# Patient Record
Sex: FEMALE | Race: OTHER | HISPANIC OR LATINO
[De-identification: names, ages, dates, MRNs, and addresses within clinical notes are randomized per-mention and may not be internally consistent; named-entity substitution may affect disease eponyms.]

---

## 2023-04-28 PROBLEM — Z00.00 ENCOUNTER FOR PREVENTIVE HEALTH EXAMINATION: Status: ACTIVE | Noted: 2023-04-28

## 2023-05-01 ENCOUNTER — APPOINTMENT (OUTPATIENT)
Dept: PEDIATRIC CARDIOLOGY | Facility: CLINIC | Age: 38
End: 2023-05-01
Payer: COMMERCIAL

## 2023-05-01 PROCEDURE — 76820 UMBILICAL ARTERY ECHO: CPT

## 2023-05-01 PROCEDURE — 76827 ECHO EXAM OF FETAL HEART: CPT

## 2023-05-01 PROCEDURE — 99203 OFFICE O/P NEW LOW 30 MIN: CPT | Mod: 25

## 2023-05-01 PROCEDURE — 76825 ECHO EXAM OF FETAL HEART: CPT

## 2023-05-01 PROCEDURE — 76821 MIDDLE CEREBRAL ARTERY ECHO: CPT

## 2023-05-01 PROCEDURE — 93325 DOPPLER ECHO COLOR FLOW MAPG: CPT | Mod: 59

## 2023-07-29 ENCOUNTER — INPATIENT (INPATIENT)
Facility: HOSPITAL | Age: 38
LOS: 0 days | Discharge: ANOTHER HOSPITAL | DRG: 305 | End: 2023-07-29
Attending: OBSTETRICS & GYNECOLOGY | Admitting: OBSTETRICS & GYNECOLOGY
Payer: COMMERCIAL

## 2023-07-29 ENCOUNTER — INPATIENT (INPATIENT)
Facility: HOSPITAL | Age: 38
LOS: 2 days | Discharge: HOME | End: 2023-08-01
Attending: SPECIALIST | Admitting: SPECIALIST
Payer: COMMERCIAL

## 2023-07-29 VITALS
DIASTOLIC BLOOD PRESSURE: 69 MMHG | RESPIRATION RATE: 18 BRPM | SYSTOLIC BLOOD PRESSURE: 123 MMHG | TEMPERATURE: 98 F | HEART RATE: 82 BPM

## 2023-07-29 VITALS — HEIGHT: 60 IN | WEIGHT: 149.91 LBS

## 2023-07-29 DIAGNOSIS — Z3A.00 WEEKS OF GESTATION OF PREGNANCY NOT SPECIFIED: ICD-10-CM

## 2023-07-29 DIAGNOSIS — Q87.2 CONGENITAL MALFORMATION SYNDROMES PREDOMINANTLY INVOLVING LIMBS: ICD-10-CM

## 2023-07-29 DIAGNOSIS — O26.899 OTHER SPECIFIED PREGNANCY RELATED CONDITIONS, UNSPECIFIED TRIMESTER: ICD-10-CM

## 2023-07-29 DIAGNOSIS — Z34.80 ENCOUNTER FOR SUPERVISION OF OTHER NORMAL PREGNANCY, UNSPECIFIED TRIMESTER: ICD-10-CM

## 2023-07-29 DIAGNOSIS — O42.10 PREMATURE RUPTURE OF MEMBRANES, ONSET OF LABOR MORE THAN 24 HOURS FOLLOWING RUPTURE, UNSPECIFIED WEEKS OF GESTATION: ICD-10-CM

## 2023-07-29 LAB
ABO RH CONFIRMATION: SIGNIFICANT CHANGE UP
AMNISURE ROM (RUPTURE OF MEMBRANES): POSITIVE
APTT BLD: 27.5 SEC — SIGNIFICANT CHANGE UP (ref 24.5–35.6)
BASOPHILS # BLD AUTO: 0.01 K/UL — SIGNIFICANT CHANGE UP (ref 0–0.2)
BASOPHILS # BLD AUTO: 0.03 K/UL — SIGNIFICANT CHANGE UP (ref 0–0.2)
BASOPHILS NFR BLD AUTO: 0.1 % — SIGNIFICANT CHANGE UP (ref 0–2)
BASOPHILS NFR BLD AUTO: 0.3 % — SIGNIFICANT CHANGE UP (ref 0–2)
BLD GP AB SCN SERPL QL: NEGATIVE — SIGNIFICANT CHANGE UP
EOSINOPHIL # BLD AUTO: 0 K/UL — SIGNIFICANT CHANGE UP (ref 0–0.5)
EOSINOPHIL # BLD AUTO: 0.07 K/UL — SIGNIFICANT CHANGE UP (ref 0–0.5)
EOSINOPHIL NFR BLD AUTO: 0 % — SIGNIFICANT CHANGE UP (ref 0–6)
EOSINOPHIL NFR BLD AUTO: 0.7 % — SIGNIFICANT CHANGE UP (ref 0–6)
HBV SURFACE AG SERPL QL IA: SIGNIFICANT CHANGE UP
HCT VFR BLD CALC: 40.2 % — SIGNIFICANT CHANGE UP (ref 34.5–45)
HCT VFR BLD CALC: 41.3 % — SIGNIFICANT CHANGE UP (ref 34.5–45)
HGB BLD-MCNC: 13.3 G/DL — SIGNIFICANT CHANGE UP (ref 11.5–15.5)
HGB BLD-MCNC: 13.8 G/DL — SIGNIFICANT CHANGE UP (ref 11.5–15.5)
HIV 1 & 2 AB SERPL IA.RAPID: SIGNIFICANT CHANGE UP
HIV 1+2 AB+HIV1 P24 AG SERPL QL IA: SIGNIFICANT CHANGE UP
IANC: 6.64 K/UL — SIGNIFICANT CHANGE UP (ref 1.8–7.4)
IMM GRANULOCYTES NFR BLD AUTO: 0.4 % — SIGNIFICANT CHANGE UP (ref 0–0.9)
IMM GRANULOCYTES NFR BLD AUTO: 0.4 % — SIGNIFICANT CHANGE UP (ref 0–0.9)
INR BLD: 0.87 RATIO — SIGNIFICANT CHANGE UP (ref 0.85–1.18)
LYMPHOCYTES # BLD AUTO: 1.19 K/UL — SIGNIFICANT CHANGE UP (ref 1–3.3)
LYMPHOCYTES # BLD AUTO: 14.9 % — SIGNIFICANT CHANGE UP (ref 13–44)
LYMPHOCYTES # BLD AUTO: 2.82 K/UL — SIGNIFICANT CHANGE UP (ref 1–3.3)
LYMPHOCYTES # BLD AUTO: 28.6 % — SIGNIFICANT CHANGE UP (ref 13–44)
MCHC RBC-ENTMCNC: 32.1 PG — SIGNIFICANT CHANGE UP (ref 27–34)
MCHC RBC-ENTMCNC: 32.2 PG — SIGNIFICANT CHANGE UP (ref 27–34)
MCHC RBC-ENTMCNC: 33.1 GM/DL — SIGNIFICANT CHANGE UP (ref 32–36)
MCHC RBC-ENTMCNC: 33.4 GM/DL — SIGNIFICANT CHANGE UP (ref 32–36)
MCV RBC AUTO: 96 FL — SIGNIFICANT CHANGE UP (ref 80–100)
MCV RBC AUTO: 97.3 FL — SIGNIFICANT CHANGE UP (ref 80–100)
MONOCYTES # BLD AUTO: 0.1 K/UL — SIGNIFICANT CHANGE UP (ref 0–0.9)
MONOCYTES # BLD AUTO: 0.57 K/UL — SIGNIFICANT CHANGE UP (ref 0–0.9)
MONOCYTES NFR BLD AUTO: 1.3 % — LOW (ref 2–14)
MONOCYTES NFR BLD AUTO: 5.8 % — SIGNIFICANT CHANGE UP (ref 2–14)
NEUTROPHILS # BLD AUTO: 6.34 K/UL — SIGNIFICANT CHANGE UP (ref 1.8–7.4)
NEUTROPHILS # BLD AUTO: 6.64 K/UL — SIGNIFICANT CHANGE UP (ref 1.8–7.4)
NEUTROPHILS NFR BLD AUTO: 64.2 % — SIGNIFICANT CHANGE UP (ref 43–77)
NEUTROPHILS NFR BLD AUTO: 83.3 % — HIGH (ref 43–77)
NRBC # BLD: 0 /100 WBCS — SIGNIFICANT CHANGE UP (ref 0–0)
NRBC # BLD: 0 /100 WBCS — SIGNIFICANT CHANGE UP (ref 0–0)
NRBC # FLD: 0 K/UL — SIGNIFICANT CHANGE UP (ref 0–0)
PLATELET # BLD AUTO: 221 K/UL — SIGNIFICANT CHANGE UP (ref 150–400)
PLATELET # BLD AUTO: 224 K/UL — SIGNIFICANT CHANGE UP (ref 150–400)
PROTHROM AB SERPL-ACNC: 10 SEC — SIGNIFICANT CHANGE UP (ref 9.5–13)
RBC # BLD: 4.13 M/UL — SIGNIFICANT CHANGE UP (ref 3.8–5.2)
RBC # BLD: 4.3 M/UL — SIGNIFICANT CHANGE UP (ref 3.8–5.2)
RBC # FLD: 13.8 % — SIGNIFICANT CHANGE UP (ref 10.3–14.5)
RBC # FLD: 13.8 % — SIGNIFICANT CHANGE UP (ref 10.3–14.5)
RH IG SCN BLD-IMP: POSITIVE — SIGNIFICANT CHANGE UP
RH IG SCN BLD-IMP: POSITIVE — SIGNIFICANT CHANGE UP
WBC # BLD: 7.97 K/UL — SIGNIFICANT CHANGE UP (ref 3.8–10.5)
WBC # BLD: 9.87 K/UL — SIGNIFICANT CHANGE UP (ref 3.8–10.5)
WBC # FLD AUTO: 7.97 K/UL — SIGNIFICANT CHANGE UP (ref 3.8–10.5)
WBC # FLD AUTO: 9.87 K/UL — SIGNIFICANT CHANGE UP (ref 3.8–10.5)

## 2023-07-29 PROCEDURE — 85025 COMPLETE CBC W/AUTO DIFF WBC: CPT

## 2023-07-29 PROCEDURE — 86901 BLOOD TYPING SEROLOGIC RH(D): CPT

## 2023-07-29 PROCEDURE — 87491 CHLMYD TRACH DNA AMP PROBE: CPT

## 2023-07-29 PROCEDURE — 36415 COLL VENOUS BLD VENIPUNCTURE: CPT

## 2023-07-29 PROCEDURE — 72132 CT LUMBAR SPINE W/DYE: CPT | Mod: 26

## 2023-07-29 PROCEDURE — 85730 THROMBOPLASTIN TIME PARTIAL: CPT

## 2023-07-29 PROCEDURE — G0463: CPT

## 2023-07-29 PROCEDURE — 86703 HIV-1/HIV-2 1 RESULT ANTBDY: CPT

## 2023-07-29 PROCEDURE — 87389 HIV-1 AG W/HIV-1&-2 AB AG IA: CPT

## 2023-07-29 PROCEDURE — 87653 STREP B DNA AMP PROBE: CPT

## 2023-07-29 PROCEDURE — 86923 COMPATIBILITY TEST ELECTRIC: CPT

## 2023-07-29 PROCEDURE — 87591 N.GONORRHOEAE DNA AMP PROB: CPT

## 2023-07-29 PROCEDURE — 86850 RBC ANTIBODY SCREEN: CPT

## 2023-07-29 PROCEDURE — 84112 EVAL AMNIOTIC FLUID PROTEIN: CPT

## 2023-07-29 PROCEDURE — 87340 HEPATITIS B SURFACE AG IA: CPT

## 2023-07-29 PROCEDURE — 85384 FIBRINOGEN ACTIVITY: CPT

## 2023-07-29 PROCEDURE — 72129 CT CHEST SPINE W/DYE: CPT | Mod: 26

## 2023-07-29 PROCEDURE — 86780 TREPONEMA PALLIDUM: CPT

## 2023-07-29 PROCEDURE — 59025 FETAL NON-STRESS TEST: CPT

## 2023-07-29 PROCEDURE — 86762 RUBELLA ANTIBODY: CPT

## 2023-07-29 PROCEDURE — 85610 PROTHROMBIN TIME: CPT

## 2023-07-29 PROCEDURE — 86900 BLOOD TYPING SEROLOGIC ABO: CPT

## 2023-07-29 RX ORDER — ASPIRIN/CALCIUM CARB/MAGNESIUM 324 MG
1 TABLET ORAL
Refills: 0 | DISCHARGE

## 2023-07-29 RX ORDER — SODIUM CHLORIDE 9 MG/ML
1000 INJECTION INTRAMUSCULAR; INTRAVENOUS; SUBCUTANEOUS
Refills: 0 | Status: DISCONTINUED | OUTPATIENT
Start: 2023-07-29 | End: 2023-07-29

## 2023-07-29 RX ORDER — AMPICILLIN TRIHYDRATE 250 MG
1 CAPSULE ORAL EVERY 4 HOURS
Refills: 0 | Status: DISCONTINUED | OUTPATIENT
Start: 2023-07-29 | End: 2023-07-29

## 2023-07-29 RX ORDER — CHLORHEXIDINE GLUCONATE 213 G/1000ML
1 SOLUTION TOPICAL DAILY
Refills: 0 | Status: DISCONTINUED | OUTPATIENT
Start: 2023-07-29 | End: 2023-07-29

## 2023-07-29 RX ORDER — CHLORHEXIDINE GLUCONATE 213 G/1000ML
1 SOLUTION TOPICAL DAILY
Refills: 0 | Status: DISCONTINUED | OUTPATIENT
Start: 2023-07-29 | End: 2023-07-30

## 2023-07-29 RX ORDER — SODIUM CHLORIDE 9 MG/ML
1000 INJECTION, SOLUTION INTRAVENOUS
Refills: 0 | Status: DISCONTINUED | OUTPATIENT
Start: 2023-07-29 | End: 2023-07-30

## 2023-07-29 RX ORDER — SODIUM CHLORIDE 9 MG/ML
1000 INJECTION, SOLUTION INTRAVENOUS
Refills: 0 | Status: DISCONTINUED | OUTPATIENT
Start: 2023-07-29 | End: 2023-07-29

## 2023-07-29 RX ORDER — AMPICILLIN TRIHYDRATE 250 MG
1 CAPSULE ORAL EVERY 4 HOURS
Refills: 0 | Status: DISCONTINUED | OUTPATIENT
Start: 2023-07-29 | End: 2023-07-30

## 2023-07-29 RX ORDER — CITRIC ACID/SODIUM CITRATE 300-500 MG
15 SOLUTION, ORAL ORAL EVERY 6 HOURS
Refills: 0 | Status: DISCONTINUED | OUTPATIENT
Start: 2023-07-29 | End: 2023-07-30

## 2023-07-29 RX ORDER — BUTORPHANOL TARTRATE 2 MG/ML
1.5 INJECTION, SOLUTION INTRAMUSCULAR; INTRAVENOUS ONCE
Refills: 0 | Status: DISCONTINUED | OUTPATIENT
Start: 2023-07-29 | End: 2023-07-30

## 2023-07-29 RX ORDER — AMPICILLIN TRIHYDRATE 250 MG
2 CAPSULE ORAL ONCE
Refills: 0 | Status: COMPLETED | OUTPATIENT
Start: 2023-07-29 | End: 2023-07-29

## 2023-07-29 RX ORDER — ASPIRIN/CALCIUM CARB/MAGNESIUM 324 MG
0 TABLET ORAL
Refills: 0 | DISCHARGE

## 2023-07-29 RX ORDER — OXYTOCIN 10 UNIT/ML
333.33 VIAL (ML) INJECTION
Qty: 20 | Refills: 0 | Status: DISCONTINUED | OUTPATIENT
Start: 2023-07-29 | End: 2023-07-31

## 2023-07-29 RX ADMIN — Medication 108 GRAM(S): at 12:51

## 2023-07-29 RX ADMIN — SODIUM CHLORIDE 125 MILLILITER(S): 9 INJECTION, SOLUTION INTRAVENOUS at 08:04

## 2023-07-29 RX ADMIN — SODIUM CHLORIDE 125 MILLILITER(S): 9 INJECTION, SOLUTION INTRAVENOUS at 21:12

## 2023-07-29 RX ADMIN — Medication 108 GRAM(S): at 17:10

## 2023-07-29 RX ADMIN — Medication 108 GRAM(S): at 21:12

## 2023-07-29 RX ADMIN — Medication 200 GRAM(S): at 08:43

## 2023-07-29 RX ADMIN — CHLORHEXIDINE GLUCONATE 1 APPLICATION(S): 213 SOLUTION TOPICAL at 17:13

## 2023-07-29 RX ADMIN — SODIUM CHLORIDE 125 MILLILITER(S): 9 INJECTION, SOLUTION INTRAVENOUS at 17:10

## 2023-07-29 RX ADMIN — Medication 12 MILLIGRAM(S): at 08:40

## 2023-07-29 RX ADMIN — SODIUM CHLORIDE 125 MILLILITER(S): 9 INJECTION, SOLUTION INTRAVENOUS at 09:05

## 2023-07-29 NOTE — OB PROVIDER H&P - ASSESSMENT
38yo F  @36+1wga transfer from [facility] for PPROM @6a. 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 Patient follows with a vascular surgeon and imaging was never brought up during the pregnancy. Here radiology recommends a CT thoracolumbar w/ IV contrast since MRI w/ IV contrast is contraindicated in pregnancy.     Plan  - Admit to L&D. Routine Labs. IVF.  - Explained to patient that she may not be able to get neuraxial anesthesia. If she requires a  she would undergo general anesthesia.  - CT thoracolumber w/ IV contrast  - Fetus: cat 1 tracing. VTX. EFW 2268g by  sono. Continuous EFM. No concerns.  - Prenatal issues: GDMA1, f/u FS q4 in latent, q2 in active labor  - GBS unknown but will treat w/ amp for prematurity  - Anesthesia consult    Patient discussed with attending physician, Dr. Girard.    THADDEUS Rueda, PGY2

## 2023-07-29 NOTE — OB PROVIDER H&P - NSHPPHYSICALEXAM_GEN_ALL_CORE
T(C): --  HR: 82 (07-29-23 @ 15:54) (82 - 82)  BP: 123/69 (07-29-23 @ 15:54) (123/69 - 123/69)  RR: --  SpO2: --    Gen: NAD  CV: RRR  Pulm: breathing comfortably on RA  Abd: gravid, nontender  Extr: moving all extremities with ease  – Spec: pos pooling, pos nitrazine, bleeding,  – VE: 0.5/0/-3  – FHT Cat I: baseline 135, mod variability, +accels, -decels  – Cornfields: q7 min (patient does not feel them)  – Sono: vertex

## 2023-07-29 NOTE — ACUTE INTERFACILITY TRANSFER NOTE - PLAN OF CARE
betamethasone 12mg given amnisure positive  gbs unknown, ampicillin given  continuous maternal/fetal monitoring

## 2023-07-29 NOTE — ACUTE INTERFACILITY TRANSFER NOTE - CARE PROVIDER_API CALL
Bernadine Arshad  Obstetrics and Gynecology  [address], [address]  [place] [address]  Phone: [phone]  Fax: [phone]  Follow Up Time:

## 2023-07-29 NOTE — CHART NOTE - NSCHARTNOTEFT_GEN_A_CORE
Note delayed secondary to clinical duties    Discussed with MFM continuing betamethasone for FLM. Patient received first dose of betamethasone at 8:40am at [facility]. Will give second dose 7/30 at same time.    d/w Dr. Lucas Rueda, PGY3

## 2023-07-29 NOTE — PATIENT PROFILE OB - PRETERM DELIVERIES, OB PROFILE
PROVIDER:[TOKEN:[[id number]],FOLLOWUP:[2 weeks]] PROVIDER:[TOKEN:[[id number]],FOLLOWUP:[2 weeks]],PROVIDER:[TOKEN:[[id number]],SCHEDULEDAPPT:[02/17/2021],SCHEDULEDAPPTTIME:[01:00 PM]] PROVIDER:[TOKEN:[[id number]],FOLLOWUP:[2 weeks]],PROVIDER:[TOKEN:[[id number]],SCHEDULEDAPPT:[02/17/2021],SCHEDULEDAPPTTIME:[01:00 PM]],PROVIDER:[TOKEN:[[id number]],SCHEDULEDAPPT:[03/16/2021],SCHEDULEDAPPTTIME:[02:00 PM]] PROVIDER:[TOKEN:[[id number]],SCHEDULEDAPPT:[02/25/2021],SCHEDULEDAPPTTIME:[12:00 PM]],PROVIDER:[TOKEN:[[id number]],SCHEDULEDAPPT:[02/17/2021],SCHEDULEDAPPTTIME:[01:00 PM]],PROVIDER:[TOKEN:[[id number]],SCHEDULEDAPPT:[03/16/2021],SCHEDULEDAPPTTIME:[02:00 PM]],PROVIDER:[TOKEN:[[id number]],SCHEDULEDAPPT:[03/03/2021],SCHEDULEDAPPTTIME:[01:00 PM]] PROVIDER:[TOKEN:[[id number]],SCHEDULEDAPPT:[02/25/2021],SCHEDULEDAPPTTIME:[12:00 PM]],PROVIDER:[TOKEN:[[id number]],SCHEDULEDAPPT:[02/17/2021],SCHEDULEDAPPTTIME:[01:00 PM]],PROVIDER:[TOKEN:[[id number]],SCHEDULEDAPPT:[03/16/2021],SCHEDULEDAPPTTIME:[02:00 PM]],PROVIDER:[TOKEN:[[id number]],SCHEDULEDAPPT:[03/03/2021],SCHEDULEDAPPTTIME:[01:00 PM]],PROVIDER:[TOKEN:[[id number]],SCHEDULEDAPPT:[03/01/2021],SCHEDULEDAPPTTIME:[09:00 AM]] 0

## 2023-07-29 NOTE — OB PROVIDER H&P - NSMATERNALFETALCONCERNS_OBGYN_ALL_OB_FT
Fetal Alert  6.21.23:Fetal echo done 23 due to suspected VSD.There is no obvious ventricular septal defect could be detected; however, a small defect cannot be excluded on fetal echocardiogram.Recommend  pediatric cardiology evaluation around 1 month of age, if clinically indicated. Stacy Rowan RN

## 2023-07-29 NOTE — OB PROVIDER H&P - HISTORY OF PRESENT ILLNESS
HPI: 38yo F  @36+1 transferred from [facility] for PROM @6a, clear fluid in the setting of maternal Klippel Trenaunay syndrome and concern for possible spinal vascular malformations that would make neuraxial anesthesia contraindicated. Patient reports that her lesions appear in her left leg as varicose veins and are not painful. She says it has gotten worse during this pregnancy and that her vulva are also swollen. She follows with a vascular surgeon who did not bring up needing imaging her spine during this pregnancy and told the patient to follow up 3 months postpartum. Patient did not have an anesthesia consult. Anesthesiologist at [facility] recommended an MRI of the spine which is not available at that hospital. Patient transferred here for imaging capabilities. Patient denies CTXs, VB. Endorses gross fetal movement. Pt denies any other concerns.    PNC: GDMA1, previously IUGR now resolved. [facility] patient  GBS unknown (pending culture from [facility] )  EFW 2264g (11%) by  sono.    OBHx: primigravida  GynHx: PCOS. Denies hx STIs, fibroids, polyps, cysts  PMH: denies hx clotting or bleeding disorders, HTN, DM  PSH: LSC cholecystectomy   PFH: no hx congenital disorders, bleeding/clotting disorders  Psych: denies   Social: denies etoh, smoking, drugs. Safe at home/in relationship.  Meds: PNV   Allergies: NKDA  Will accept blood transfusions? Yes

## 2023-07-29 NOTE — OB PROVIDER H&P - ATTENDING COMMENTS
AN  Patient seen   PPROM  Admit for CT to assess spine for vascular malformation to assess for regional anesthesia.   Followed by induction of labor  7#  GBS unknown, amp for prematurity  FREEMAN Girard

## 2023-07-29 NOTE — PATIENT PROFILE OB - BREAST MILK PROVIDES COLOSTRUM THAT IS HIGH IN PROTEIN
Received refill request for Adderall  Last office visit: 6/22/2020  Upcoming office visit: 9/22/2020  Last filled: 6/23/2020 #30 x0 RF  PDMP reviewed? No (writer does not have access)    Jessy Zuniga's Prescription refill request routed to PCP for review    
Statement Selected

## 2023-07-29 NOTE — ACUTE INTERFACILITY TRANSFER NOTE - HOSPITAL COURSE
38 yo F @ 36w1d presents to triage c/o " I broke my water at 6am."  Patient admits to fetal movement. Denies vaginal bleeding, painful contraction, trauma or any other concerns.   pnc with [facility]. c/b GDMA1  pobhx nulliparous  pgynhx: lmp 11/2022 hx PCOS, previously on metformin, denies abn pap, fibroids or stds  pmedhx: ezcema, Klippel-Tranaunay syndrome- followed by vascular surgeon.  psurghx: lap kenny 2021  allergies: ndka  social: denies toxic habits  vitals 105/63 hr 83, afebrile  gen: well appearing  abd: gravid, soft, non tender  speculum: Pooling of clear fluid with ?yellow clump odor. no active bleeding,  sve fingertip/long  left labial varicosity  left lower extremity edema, no pitting edema, right wnl  fh baseline 140 moderate variability +accels  toco q pccassional  Seen by anesthesia- recommend MRI of spine to r/o spinal cord varicosity  d/w SILVANO Arredondo fellow- recommend transfer to [facility] to receive care not available at this facility  Plan discussed with patient. Patient verbalized understanding and agrees with plan,  consent obtained by leodan Ramirez attending

## 2023-07-30 LAB
COVID-19 SPIKE DOMAIN AB INTERP: POSITIVE
COVID-19 SPIKE DOMAIN ANTIBODY RESULT: >250 U/ML — HIGH
RUBV IGG SER-ACNC: 4.4 INDEX — SIGNIFICANT CHANGE UP
RUBV IGG SER-ACNC: 4.7 INDEX — SIGNIFICANT CHANGE UP
RUBV IGG SER-IMP: POSITIVE — SIGNIFICANT CHANGE UP
RUBV IGG SER-IMP: POSITIVE — SIGNIFICANT CHANGE UP
SARS-COV-2 IGG+IGM SERPL QL IA: >250 U/ML — HIGH
SARS-COV-2 IGG+IGM SERPL QL IA: POSITIVE
T PALLIDUM AB TITR SER: NEGATIVE — SIGNIFICANT CHANGE UP
T PALLIDUM AB TITR SER: NEGATIVE — SIGNIFICANT CHANGE UP

## 2023-07-30 PROCEDURE — 59409 OBSTETRICAL CARE: CPT

## 2023-07-30 RX ORDER — KETOROLAC TROMETHAMINE 30 MG/ML
30 SYRINGE (ML) INJECTION ONCE
Refills: 0 | Status: DISCONTINUED | OUTPATIENT
Start: 2023-07-30 | End: 2023-07-30

## 2023-07-30 RX ORDER — MAGNESIUM HYDROXIDE 400 MG/1
30 TABLET, CHEWABLE ORAL
Refills: 0 | Status: DISCONTINUED | OUTPATIENT
Start: 2023-07-30 | End: 2023-08-01

## 2023-07-30 RX ORDER — BENZOCAINE 10 %
1 GEL (GRAM) MUCOUS MEMBRANE EVERY 6 HOURS
Refills: 0 | Status: DISCONTINUED | OUTPATIENT
Start: 2023-07-30 | End: 2023-08-01

## 2023-07-30 RX ORDER — LANOLIN
1 OINTMENT (GRAM) TOPICAL EVERY 6 HOURS
Refills: 0 | Status: DISCONTINUED | OUTPATIENT
Start: 2023-07-30 | End: 2023-08-01

## 2023-07-30 RX ORDER — IBUPROFEN 200 MG
600 TABLET ORAL EVERY 6 HOURS
Refills: 0 | Status: COMPLETED | OUTPATIENT
Start: 2023-07-30 | End: 2024-06-27

## 2023-07-30 RX ORDER — DIBUCAINE 1 %
1 OINTMENT (GRAM) RECTAL EVERY 6 HOURS
Refills: 0 | Status: DISCONTINUED | OUTPATIENT
Start: 2023-07-30 | End: 2023-08-01

## 2023-07-30 RX ORDER — OXYCODONE HYDROCHLORIDE 5 MG/1
5 TABLET ORAL ONCE
Refills: 0 | Status: DISCONTINUED | OUTPATIENT
Start: 2023-07-30 | End: 2023-08-01

## 2023-07-30 RX ORDER — HYDROCORTISONE 1 %
1 OINTMENT (GRAM) TOPICAL EVERY 6 HOURS
Refills: 0 | Status: DISCONTINUED | OUTPATIENT
Start: 2023-07-30 | End: 2023-08-01

## 2023-07-30 RX ORDER — SODIUM CHLORIDE 9 MG/ML
3 INJECTION INTRAMUSCULAR; INTRAVENOUS; SUBCUTANEOUS EVERY 8 HOURS
Refills: 0 | Status: DISCONTINUED | OUTPATIENT
Start: 2023-07-30 | End: 2023-08-01

## 2023-07-30 RX ORDER — DIPHENHYDRAMINE HCL 50 MG
25 CAPSULE ORAL EVERY 6 HOURS
Refills: 0 | Status: DISCONTINUED | OUTPATIENT
Start: 2023-07-30 | End: 2023-08-01

## 2023-07-30 RX ORDER — AER TRAVELER 0.5 G/1
1 SOLUTION RECTAL; TOPICAL EVERY 4 HOURS
Refills: 0 | Status: DISCONTINUED | OUTPATIENT
Start: 2023-07-30 | End: 2023-08-01

## 2023-07-30 RX ORDER — IBUPROFEN 200 MG
600 TABLET ORAL EVERY 6 HOURS
Refills: 0 | Status: DISCONTINUED | OUTPATIENT
Start: 2023-07-30 | End: 2023-08-01

## 2023-07-30 RX ORDER — SIMETHICONE 80 MG/1
80 TABLET, CHEWABLE ORAL EVERY 4 HOURS
Refills: 0 | Status: DISCONTINUED | OUTPATIENT
Start: 2023-07-30 | End: 2023-08-01

## 2023-07-30 RX ORDER — MORPHINE SULFATE 50 MG/1
4 CAPSULE, EXTENDED RELEASE ORAL ONCE
Refills: 0 | Status: DISCONTINUED | OUTPATIENT
Start: 2023-07-30 | End: 2023-07-30

## 2023-07-30 RX ORDER — TETANUS TOXOID, REDUCED DIPHTHERIA TOXOID AND ACELLULAR PERTUSSIS VACCINE, ADSORBED 5; 2.5; 8; 8; 2.5 [IU]/.5ML; [IU]/.5ML; UG/.5ML; UG/.5ML; UG/.5ML
0.5 SUSPENSION INTRAMUSCULAR ONCE
Refills: 0 | Status: DISCONTINUED | OUTPATIENT
Start: 2023-07-30 | End: 2023-08-01

## 2023-07-30 RX ORDER — ACETAMINOPHEN 500 MG
975 TABLET ORAL
Refills: 0 | Status: DISCONTINUED | OUTPATIENT
Start: 2023-07-30 | End: 2023-08-01

## 2023-07-30 RX ORDER — OXYCODONE HYDROCHLORIDE 5 MG/1
5 TABLET ORAL
Refills: 0 | Status: DISCONTINUED | OUTPATIENT
Start: 2023-07-30 | End: 2023-08-01

## 2023-07-30 RX ORDER — PRAMOXINE HYDROCHLORIDE 150 MG/15G
1 AEROSOL, FOAM RECTAL EVERY 4 HOURS
Refills: 0 | Status: DISCONTINUED | OUTPATIENT
Start: 2023-07-30 | End: 2023-08-01

## 2023-07-30 RX ADMIN — MORPHINE SULFATE 4 MILLIGRAM(S): 50 CAPSULE, EXTENDED RELEASE ORAL at 00:45

## 2023-07-30 RX ADMIN — Medication 30 MILLIGRAM(S): at 10:39

## 2023-07-30 RX ADMIN — Medication 975 MILLIGRAM(S): at 21:48

## 2023-07-30 RX ADMIN — Medication 108 GRAM(S): at 05:17

## 2023-07-30 RX ADMIN — Medication 600 MILLIGRAM(S): at 18:32

## 2023-07-30 RX ADMIN — Medication 975 MILLIGRAM(S): at 22:48

## 2023-07-30 RX ADMIN — Medication 30 MILLIGRAM(S): at 11:30

## 2023-07-30 RX ADMIN — Medication 600 MILLIGRAM(S): at 17:57

## 2023-07-30 RX ADMIN — SODIUM CHLORIDE 125 MILLILITER(S): 9 INJECTION, SOLUTION INTRAVENOUS at 00:25

## 2023-07-30 RX ADMIN — Medication 108 GRAM(S): at 01:20

## 2023-07-30 RX ADMIN — MORPHINE SULFATE 4 MILLIGRAM(S): 50 CAPSULE, EXTENDED RELEASE ORAL at 00:24

## 2023-07-30 RX ADMIN — SODIUM CHLORIDE 3 MILLILITER(S): 9 INJECTION INTRAMUSCULAR; INTRAVENOUS; SUBCUTANEOUS at 14:45

## 2023-07-30 NOTE — OB PROVIDER LABOR PROGRESS NOTE - NS_OBIHIFHRDETAILS_OBGYN_ALL_OB_FT
Baseline 135, mod variability, +accels, -decels  Areas of discontinuity when patient has contractions secondary to pain / movement
135, min variability +acc, -dec,

## 2023-07-30 NOTE — OB PROVIDER DELIVERY SUMMARY - NSSELHIDDEN_OBGYN_ALL_OB_FT
[NS_DeliveryAttending1_OBGYN_ALL_OB_FT:[id number]],[NS_DeliveryAssist1_OBGYN_ALL_OB_FT:[id number]],[NS_DeliveryAssist2_OBGYN_ALL_OB_FT:[id number]] [NS_DeliveryAttending1_OBGYN_ALL_OB_FT:[id number]],[NS_DeliveryAssist1_OBGYN_ALL_OB_FT:[id number]],[NS_DeliveryAssist2_OBGYN_ALL_OB_FT:[id number]=],[NS_DeliveryAttending2_OBGYN_ALL_OB_FT:[id number]]

## 2023-07-30 NOTE — OB RN DELIVERY SUMMARY - NS_VACUUMATTEMPT_OBGYN_ALL_OB
5-Fu Pregnancy And Lactation Text: This medication is Pregnancy Category X and contraindicated in pregnancy and in women who may become pregnant. It is unknown if this medication is excreted in breast milk. Vacuum Extraction was not used

## 2023-07-30 NOTE — OB RN DELIVERY SUMMARY - NS_SEPSISRSKCALC_OBGYN_ALL_OB_FT
EOS calculated successfully. EOS Risk Factor: 0.5/1000 live births ([facility] national incidence); GA=36w2d; Temp=98.6; ROM=26.767; GBS='Unknown'; Antibiotics='GBS specific antibiotics > 2 hrs prior to birth'

## 2023-07-30 NOTE — OB RN DELIVERY SUMMARY - NSSELHIDDEN_OBGYN_ALL_OB_FT
[NS_DeliveryAttending1_OBGYN_ALL_OB_FT:[id number]],[NS_DeliveryAssist1_OBGYN_ALL_OB_FT:[id number]],[NS_DeliveryAssist2_OBGYN_ALL_OB_FT:[id number]=],[NS_DeliveryRN_OBGYN_ALL_OB_FT:[id number]]
.

## 2023-07-30 NOTE — OB NEONATOLOGY/PEDIATRICIAN DELIVERY SUMMARY - NSPEDSNEONOTESA_OBGYN_ALL_OB_FT
36+2wk male born via  to a 37 y/o  blood type O+ mother. Maternal history of Kliptel Trenaunay Syndrome, presenting with L leg and labia swelling during pregnancy, as well as GDMA1. PNL -/-/NR/I, GBS status unknown. Patient received 5 doses of ampicillin. SROM at 6 AM on  (prolonged) with clear fluids. Highest maternal temperature 37.0. Baby emerged vigorous, crying. Cord clamping delayed 60sec.  Infant was brought to radiant warmer and warmed, dried, stimulated and suctioned. HR>100, normal respiratory effort. with APGARS of 8/9 .  Mom plans to initiate breastfeeding as well as formula feed, consents Hep B vaccine and declines circ.  EOS 0.06.    BW: 2175 g  : 23  TOB: 08:46    Physical Exam:  Gen: NAD, +grimace  HEENT: anterior fontanel open soft and flat, no cleft lip/palate, ears normal set, no ear pits or tags. no lesions in mouth/throat, nares clinically patent  Resp: no increased work of breathing, good air entry b/l, clear to auscultation bilaterally  Cardio: Normal S1/S2, regular rate and rhythm, no murmurs, rubs or gallops  Abd: soft, non tender, non distended, + bowel sounds, umbilical cord with 3 vessels  Neuro: +grasp/suck/jhony, normal tone  Extremities: negative drew and ortolani, moving all extremities, full range of motion x 4, no crepitus  Skin: pink, warm  Genitals: Normal male anatomy, testicles palpable in scrotum b/l, Garett 1, anus patent 36+2wk SGA male born via  to a 35 y/o  blood type O+ mother. Maternal history of Kliptel Trenaunay Syndrome, presenting with L leg and labia swelling during pregnancy, as well as GDMA1. PNL -/-/NR/I, GBS status unknown. Patient received 5 doses of ampicillin. SROM at 6 AM on  (prolonged) with clear fluids. Highest maternal temperature 37.0. Baby emerged vigorous, crying. Cord clamping delayed 60sec.  Infant was brought to radiant warmer and warmed, dried, stimulated and suctioned. HR>100, normal respiratory effort. with APGARS of 8/9 .  Mom plans to initiate breastfeeding as well as formula feed, consents Hep B vaccine and declines circ.  EOS 0.06.    BW: 2175 g  : 23  TOB: 08:46    Physical Exam:  Gen: NAD, +grimace  HEENT: anterior fontanel open soft and flat, no cleft lip/palate, ears normal set, no ear pits or tags. no lesions in mouth/throat, nares clinically patent  Resp: no increased work of breathing, good air entry b/l, clear to auscultation bilaterally  Cardio: Normal S1/S2, regular rate and rhythm, no murmurs, rubs or gallops  Abd: soft, non tender, non distended, + bowel sounds, umbilical cord with 3 vessels  Neuro: +grasp/suck/jhony, normal tone  Extremities: negative drew and ortolani, moving all extremities, full range of motion x 4, no crepitus  Skin: pink, warm  Genitals: Normal male anatomy, testicles palpable in scrotum b/l, Garett 1, anus patent

## 2023-07-30 NOTE — OB PROVIDER DELIVERY SUMMARY - NSPROVIDERDELIVERYNOTE_OBGYN_ALL_OB_FT
Patient found to be fully dilated at + 1 station without caput at SUDEEP position. She was instructed on pushing and pushed well. Spontaneous vaginal delivery of liveborn infant from SUDEEP position. Head, shoulders, and body delivered easily. Infant was suctioned. No mec. 1 minute delayed cord clamping was performed. Cord clamped and cut and infant passed to mother. Placenta delivered intact with a 3 vessel cord. Fundal massage was given and uterine fundus was found to be firm. Vaginal exam revealed an intact cervix, vaginal walls, and sulci. Patient had a 2nd degree laceration in the perineum that was repaired with 2.0 chromic suture. Excellent hemostasis was noted. Patient was stable. Count was correct x2.     Brea Rueda  PGY-3

## 2023-07-30 NOTE — OB PROVIDER LABOR PROGRESS NOTE - ASSESSMENT
8yo F  @36+1wga transfer from [facility] for PPROM @6a. 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 Awaiting CT read.    Plan:  - Continue expectant management   - Epidural as soon as CT read available.       To be d/w Dr. Shaji Orozco, PGY1
A/P 37y P0 @ 36/2wks IOL PPROM  -IOL: PO   BMZ #2 today  -Analgesia: patient received morphine. Is not able to have Epidural at this time . Awaiting final CT scan read for anesthesias approval for epidural   Radiology resident called at [phone] , at this time night attending not available for reads, however resident will contact neuro radiologist to attempt expediting read   -Anticipate       Emani Abdi PGY-4

## 2023-07-30 NOTE — OB PROVIDER LABOR PROGRESS NOTE - NS_SUBJECTIVE/OBJECTIVE_OBGYN_ALL_OB_FT
R4 Labor Note    S: Patient evaluated at bedside for cervical change.     O:  T(C): 37.0 (07-30-23 @ 06:00), Max: 37.0 (07-29-23 @ 20:07)  HR: 85 (07-30-23 @ 05:32) (67 - 107)  BP: 106/56 (07-30-23 @ 04:02) (96/55 - 128/64)  RR: 17 (07-30-23 @ 06:00) (16 - 19)  SpO2: 98% (07-30-23 @ 05:32) (74% - 99%)
Patient seen for cervical check. Patient in pain.

## 2023-07-31 ENCOUNTER — TRANSCRIPTION ENCOUNTER (OUTPATIENT)
Age: 38
End: 2023-07-31

## 2023-07-31 LAB
BASOPHILS # BLD AUTO: 0.06 K/UL — SIGNIFICANT CHANGE UP (ref 0–0.2)
BASOPHILS NFR BLD AUTO: 0.4 % — SIGNIFICANT CHANGE UP (ref 0–2)
C TRACH RRNA SPEC QL NAA+PROBE: SIGNIFICANT CHANGE UP
EOSINOPHIL # BLD AUTO: 0.07 K/UL — SIGNIFICANT CHANGE UP (ref 0–0.5)
EOSINOPHIL NFR BLD AUTO: 0.5 % — SIGNIFICANT CHANGE UP (ref 0–6)
GROUP B BETA STREP DNA (PCR): SIGNIFICANT CHANGE UP
HCT VFR BLD CALC: 30.4 % — LOW (ref 34.5–45)
HGB BLD-MCNC: 10 G/DL — LOW (ref 11.5–15.5)
IANC: 10.98 K/UL — HIGH (ref 1.8–7.4)
IMM GRANULOCYTES NFR BLD AUTO: 0.5 % — SIGNIFICANT CHANGE UP (ref 0–0.9)
LYMPHOCYTES # BLD AUTO: 19.8 % — SIGNIFICANT CHANGE UP (ref 13–44)
LYMPHOCYTES # BLD AUTO: 2.99 K/UL — SIGNIFICANT CHANGE UP (ref 1–3.3)
MCHC RBC-ENTMCNC: 31.6 PG — SIGNIFICANT CHANGE UP (ref 27–34)
MCHC RBC-ENTMCNC: 32.9 GM/DL — SIGNIFICANT CHANGE UP (ref 32–36)
MCV RBC AUTO: 96.2 FL — SIGNIFICANT CHANGE UP (ref 80–100)
MONOCYTES # BLD AUTO: 0.93 K/UL — HIGH (ref 0–0.9)
MONOCYTES NFR BLD AUTO: 6.2 % — SIGNIFICANT CHANGE UP (ref 2–14)
N GONORRHOEA RRNA SPEC QL NAA+PROBE: SIGNIFICANT CHANGE UP
NEUTROPHILS # BLD AUTO: 10.98 K/UL — HIGH (ref 1.8–7.4)
NEUTROPHILS NFR BLD AUTO: 72.6 % — SIGNIFICANT CHANGE UP (ref 43–77)
NRBC # BLD: 0 /100 WBCS — SIGNIFICANT CHANGE UP (ref 0–0)
NRBC # FLD: 0 K/UL — SIGNIFICANT CHANGE UP (ref 0–0)
PLATELET # BLD AUTO: 193 K/UL — SIGNIFICANT CHANGE UP (ref 150–400)
RBC # BLD: 3.16 M/UL — LOW (ref 3.8–5.2)
RBC # FLD: 14.1 % — SIGNIFICANT CHANGE UP (ref 10.3–14.5)
SOURCE GROUP B STREP: SIGNIFICANT CHANGE UP
SPECIMEN SOURCE: SIGNIFICANT CHANGE UP
WBC # BLD: 15.1 K/UL — HIGH (ref 3.8–10.5)
WBC # FLD AUTO: 15.1 K/UL — HIGH (ref 3.8–10.5)

## 2023-07-31 RX ORDER — IBUPROFEN 200 MG
1 TABLET ORAL
Qty: 0 | Refills: 0 | DISCHARGE
Start: 2023-07-31

## 2023-07-31 RX ORDER — ACETAMINOPHEN 500 MG
3 TABLET ORAL
Qty: 0 | Refills: 0 | DISCHARGE
Start: 2023-07-31

## 2023-07-31 RX ORDER — NORETHINDRONE 0.35 MG/1
1 TABLET ORAL
Qty: 28 | Refills: 3
Start: 2023-07-31 | End: 2023-11-19

## 2023-07-31 RX ADMIN — Medication 600 MILLIGRAM(S): at 12:17

## 2023-07-31 RX ADMIN — SODIUM CHLORIDE 3 MILLILITER(S): 9 INJECTION INTRAMUSCULAR; INTRAVENOUS; SUBCUTANEOUS at 21:36

## 2023-07-31 RX ADMIN — Medication 600 MILLIGRAM(S): at 06:05

## 2023-07-31 RX ADMIN — Medication 975 MILLIGRAM(S): at 08:53

## 2023-07-31 RX ADMIN — Medication 600 MILLIGRAM(S): at 00:59

## 2023-07-31 RX ADMIN — Medication 600 MILLIGRAM(S): at 13:00

## 2023-07-31 RX ADMIN — Medication 1 TABLET(S): at 12:17

## 2023-07-31 RX ADMIN — Medication 600 MILLIGRAM(S): at 16:58

## 2023-07-31 RX ADMIN — Medication 975 MILLIGRAM(S): at 09:30

## 2023-07-31 RX ADMIN — Medication 600 MILLIGRAM(S): at 01:59

## 2023-07-31 RX ADMIN — SODIUM CHLORIDE 3 MILLILITER(S): 9 INJECTION INTRAMUSCULAR; INTRAVENOUS; SUBCUTANEOUS at 16:22

## 2023-07-31 RX ADMIN — Medication 600 MILLIGRAM(S): at 17:30

## 2023-07-31 NOTE — DISCHARGE NOTE OB - NS MD DC FALL RISK RISK
For information on Fall & Injury Prevention, visit: [email or web address] OR  [email or web address] OR  https://www.cdc.gov/steadi/patient.html

## 2023-07-31 NOTE — PROGRESS NOTE ADULT - SUBJECTIVE AND OBJECTIVE BOX
OB Progress Note:  PPD#1    S: 36yo  PPD#1 s/p  with Hx Klippel Trenaunay syndrome. Patient feels well. Pain is well controlled. She is tolerating a regular diet and passing flatus. She is voiding spontaneously, and ambulating without difficulty. Denies CP/SOB. Denies lightheadedness/dizziness. Denies N/V.    O:  Vitals:  Vital Signs Last 24 Hrs  T(C): 36.7 (2023 05:50), Max: 37.3 (2023 21:47)  T(F): 98.1 (2023 05:50), Max: 99.1 (2023 21:47)  HR: 72 (2023 05:50) (72 - 120)  BP: 92/54 (2023 05:50) (92/54 - 128/64)  BP(mean): --  RR: 16 (2023 05:50) (16 - 18)  SpO2: 99% (2023 05:50) (88% - 100%)    Parameters below as of 2023 05:50  Patient On (Oxygen Delivery Method): room air        MEDICATIONS  (STANDING):  acetaminophen     Tablet .. 975 milliGRAM(s) Oral <User Schedule>  diphtheria/tetanus/pertussis (acellular) Vaccine (Adacel) 0.5 milliLiter(s) IntraMuscular once  ibuprofen  Tablet. 600 milliGRAM(s) Oral every 6 hours  oxytocin Infusion 333.333 milliUNIT(s)/Min (1000 mL/Hr) IV Continuous <Continuous>  prenatal multivitamin 1 Tablet(s) Oral daily  sodium chloride 0.9% lock flush 3 milliLiter(s) IV Push every 8 hours      Labs:  Blood type: O Positive  Rubella IgG: RPR: Negative                          13.8   7.97 >-----------< 224    (  @ 17:25 )             41.3                        13.3   9.87 >-----------< 221    (  @ 08:28 )             40.2                  Physical Exam:  General: NAD  Abdomen: soft, non-tender, non-distended, fundus firm  Vaginal: Lochia wnl  Extremities: No erythema/edema

## 2023-07-31 NOTE — DISCHARGE NOTE OB - NS DC ANGIO PCI YN
Anticoagulation  Patient is here for anticoagulation follow-up.  Indication: atrial fibrillation  Bleeding Signs/Symptoms:  None  Thromboembolic Signs/Symptoms:  None    Missed Coumadin Doses:  None  Medication Changes:  no  Dietary Changes:  no  Bacterial/Viral Infection:  no    Other Concerns:  no    Last INR: 2.3            Today's INR: 3.5        INR Goal: (X) 2.0-3.0  ( ) 2.5-3.5  ( ) ________    Advised patient to take warfarin as noted on medication chart given today: HOLD warfarin today Monday then take 2 tablets (5 mg) daily.  Total weekly dosage of warfarin: 30mg    Return for INR recheck in/on: 1 week    Call your physician or seek medical care immediately if you notice any of the following symptoms of a bleed:   Red, dark, coffee or cola colored urine  Red or tar like stools  Excessive bleeding from gums or nose  Vomiting coffee colored or bright red material  Coughing up red tinged sputum  Severe or unprovoked pain (ex: severe HA or Abd pain)  Sudden, spontaneous bruising for no reason  Excessive menstrual bleeding  A cut that will not stop bleeding within 10-15 mins  Symptoms associated with abnormal bleeding/high INR reviewed.  Brigid was encouraged to avoid activities that may result in a serious fall or injury and verbalizes understanding: Yes      10 minutes was spent in reviewing and counseling regarding foods high in vitamin K, the importance of taking medication daily and watching for any medication side effects such as bleeding for which the patient will call for advice or go the the Emergency Room immediately.        no

## 2023-07-31 NOTE — DISCHARGE NOTE OB - MEDICATION SUMMARY - MEDICATIONS TO TAKE
I will START or STAY ON the medications listed below when I get home from the hospital:    ibuprofen 600 mg oral tablet  -- 1 tab(s) by mouth every 6 hours  -- Indication: For Pain    acetaminophen 325 mg oral tablet  -- 3 tab(s) by mouth every 6 hours as needed for  moderate pain  -- Indication: For Pain    Prenatal Multivitamins with Folic Acid 1 mg oral tablet  -- 1 tab(s) by mouth once a day  -- Indication: For Postpartum

## 2023-07-31 NOTE — PROGRESS NOTE ADULT - ATTENDING COMMENTS
Service attending    Patient seen and examined by me today-  Doing well  will repeat CBC as slight drop in H/H   plan for discharge tomorrow.    Khari SUTHERLAND

## 2023-07-31 NOTE — DISCHARGE NOTE OB - HOSPITAL COURSE
Patient was admitted to L+D for PROM and maternal Klippel Trenaunay syndrome. Pt had an uncomplicated  followed by an uncomplicated postpartum course.  EBL: 207  Hct: 41.3  On Postpartum day 2, patient was discharged home in stable condition, voiding spontaneously, pain well controlled, ambulating, tolerating PO and with normal vital signs. Patient's postpartum birth control plan is birth control pills. Pt plans to follow up [facility] in 6 weeks. Telephone number and clinic information provided prior to discharge.  Patient was admitted to L+D for PROM and maternal Klippel Trenaunay syndrome. Pt had an uncomplicated  followed by an uncomplicated postpartum course.  EBL: 207  Hct: 41.3  On Postpartum day 1, patient was discharged home in stable condition, voiding spontaneously, pain well controlled, ambulating, tolerating PO and with normal vital signs. Patient's postpartum birth control plan is birth control pills. Pt plans to follow up with Dr. Arshad in 6 weeks. Telephone number and clinic information provided prior to discharge.  Patient was admitted to L+D for PROM and maternal Klippel Trenaunay syndrome. Pt had an uncomplicated  followed by an uncomplicated postpartum course.  EBL: 207  Hct: 41.3  On Postpartum day 2, patient was discharged home in stable condition, voiding spontaneously, pain well controlled, ambulating, tolerating PO and with normal vital signs. Patient's postpartum birth control plan is birth control pills. Pt plans to follow up with Dr. Arshad in 6 weeks. Telephone number and clinic information provided prior to discharge.

## 2023-07-31 NOTE — DISCHARGE NOTE OB - MATERIALS PROVIDED
Vaccinations/[facility]  Screening Program/  Immunization Record/Breastfeeding Log/Guide to Postpartum Care/[facility] Hearing Screen Program/Back To Sleep Handout/Shaken Baby Prevention Handout/Tdap Vaccination (VIS Pub Date: 2012)

## 2023-07-31 NOTE — DISCHARGE NOTE OB - FINDINGS/TREATMENT
You were transferred to our hospital due to PROM and Klippel Trenaunay syndrome. You had a normal vaginal delivery.

## 2023-07-31 NOTE — DISCHARGE NOTE OB - PATIENT PORTAL LINK FT
You can access the FollowMyHealth Patient Portal offered by [facility] by registering at the following website: [email or web address]. By joining [facility]’s FollowMyHealth portal, you will also be able to view your health information using other applications (apps) compatible with our system.

## 2023-07-31 NOTE — DISCHARGE NOTE OB - PLAN OF CARE
Make your follow-up appointment with your doctor as ordered. Call your doctor for your follow up appointment in 4-6 weeks. No heavy lifting, driving, or strenuous activity for 6 weeks. Nothing per vagina such as tampons, intercourse, douches or tub baths for 6 weeks or until you see your doctor. Call your doctor with any signs and symptoms of infection such as fever, chills, nausea or vomiting. Call your doctor if you’re unable to tolerate food, increase in vaginal bleeding, or have difficulty urinating. Call your doctor if you have pain that is not relieved by your prescribed medications. Notify your doctor with any other concerns. Call your doctor if you have any of these concerns in the next 6 weeks.

## 2023-07-31 NOTE — DISCHARGE NOTE OB - ADDITIONAL INSTRUCTIONS
Make your follow-up appointment with your doctor as ordered.  Make an appt in 6 weeks for a routine postpartum visit.     No heavy lifting, driving, or strenuous activity for 6 weeks. Nothing per vagina such as tampons, intercourse, douches, or tub baths for 6 weeks or until you see your doctor. Call your doctor with any signs and symptoms of infection such as fever, chills, nausea, or vomiting. Call your doctor if you're unable to tolerate food, increase in vaginal bleeding, or have difficulty urinating. Call your doctor if you have pain that is not relieved by your prescribed medications. Notify your doctor with any other concerns.     Call [phone] if you have any of these concerns in the next 6 weeks.

## 2023-07-31 NOTE — DISCHARGE NOTE OB - CARE PLAN
1 Principal Discharge DX:	Normal vaginal delivery  Assessment and plan of treatment:	Make your follow-up appointment with your doctor as ordered. Call your doctor for your follow up appointment in 4-6 weeks. No heavy lifting, driving, or strenuous activity for 6 weeks. Nothing per vagina such as tampons, intercourse, douches or tub baths for 6 weeks or until you see your doctor. Call your doctor with any signs and symptoms of infection such as fever, chills, nausea or vomiting. Call your doctor if you’re unable to tolerate food, increase in vaginal bleeding, or have difficulty urinating. Call your doctor if you have pain that is not relieved by your prescribed medications. Notify your doctor with any other concerns. Call your doctor if you have any of these concerns in the next 6 weeks.

## 2023-07-31 NOTE — DISCHARGE NOTE OB - CARE PROVIDER_API CALL
Bernadine Arshad  Obstetrics and Gynecology  [address], [address]  [place] [address]  Phone: [phone]  Fax: [phone]  Follow Up Time: 1 month

## 2023-08-01 VITALS
SYSTOLIC BLOOD PRESSURE: 110 MMHG | HEART RATE: 80 BPM | RESPIRATION RATE: 20 BRPM | DIASTOLIC BLOOD PRESSURE: 78 MMHG | TEMPERATURE: 98 F | OXYGEN SATURATION: 100 %

## 2023-08-01 LAB
BASOPHILS # BLD AUTO: 0.03 K/UL — SIGNIFICANT CHANGE UP (ref 0–0.2)
BASOPHILS NFR BLD AUTO: 0.2 % — SIGNIFICANT CHANGE UP (ref 0–2)
EOSINOPHIL # BLD AUTO: 0.13 K/UL — SIGNIFICANT CHANGE UP (ref 0–0.5)
EOSINOPHIL NFR BLD AUTO: 1 % — SIGNIFICANT CHANGE UP (ref 0–6)
HCT VFR BLD CALC: 29.3 % — LOW (ref 34.5–45)
HGB BLD-MCNC: 9.6 G/DL — LOW (ref 11.5–15.5)
IANC: 8.39 K/UL — HIGH (ref 1.8–7.4)
IMM GRANULOCYTES NFR BLD AUTO: 0.6 % — SIGNIFICANT CHANGE UP (ref 0–0.9)
LYMPHOCYTES # BLD AUTO: 25.2 % — SIGNIFICANT CHANGE UP (ref 13–44)
LYMPHOCYTES # BLD AUTO: 3.14 K/UL — SIGNIFICANT CHANGE UP (ref 1–3.3)
MCHC RBC-ENTMCNC: 31.7 PG — SIGNIFICANT CHANGE UP (ref 27–34)
MCHC RBC-ENTMCNC: 32.8 GM/DL — SIGNIFICANT CHANGE UP (ref 32–36)
MCV RBC AUTO: 96.7 FL — SIGNIFICANT CHANGE UP (ref 80–100)
MONOCYTES # BLD AUTO: 0.72 K/UL — SIGNIFICANT CHANGE UP (ref 0–0.9)
MONOCYTES NFR BLD AUTO: 5.8 % — SIGNIFICANT CHANGE UP (ref 2–14)
NEUTROPHILS # BLD AUTO: 8.39 K/UL — HIGH (ref 1.8–7.4)
NEUTROPHILS NFR BLD AUTO: 67.2 % — SIGNIFICANT CHANGE UP (ref 43–77)
NRBC # BLD: 0 /100 WBCS — SIGNIFICANT CHANGE UP (ref 0–0)
NRBC # FLD: 0 K/UL — SIGNIFICANT CHANGE UP (ref 0–0)
PLATELET # BLD AUTO: 183 K/UL — SIGNIFICANT CHANGE UP (ref 150–400)
RBC # BLD: 3.03 M/UL — LOW (ref 3.8–5.2)
RBC # FLD: 14.2 % — SIGNIFICANT CHANGE UP (ref 10.3–14.5)
WBC # BLD: 12.48 K/UL — HIGH (ref 3.8–10.5)
WBC # FLD AUTO: 12.48 K/UL — HIGH (ref 3.8–10.5)

## 2023-08-01 RX ADMIN — Medication 600 MILLIGRAM(S): at 00:00

## 2023-08-01 RX ADMIN — Medication 975 MILLIGRAM(S): at 03:45

## 2023-08-01 RX ADMIN — SODIUM CHLORIDE 3 MILLILITER(S): 9 INJECTION INTRAMUSCULAR; INTRAVENOUS; SUBCUTANEOUS at 06:00

## 2023-08-01 RX ADMIN — SODIUM CHLORIDE 3 MILLILITER(S): 9 INJECTION INTRAMUSCULAR; INTRAVENOUS; SUBCUTANEOUS at 15:23

## 2023-08-01 RX ADMIN — Medication 600 MILLIGRAM(S): at 13:00

## 2023-08-01 RX ADMIN — Medication 600 MILLIGRAM(S): at 06:45

## 2023-08-01 RX ADMIN — Medication 600 MILLIGRAM(S): at 06:07

## 2023-08-01 RX ADMIN — Medication 600 MILLIGRAM(S): at 00:45

## 2023-08-01 RX ADMIN — Medication 975 MILLIGRAM(S): at 03:00

## 2023-08-01 RX ADMIN — Medication 600 MILLIGRAM(S): at 12:19

## 2023-08-01 NOTE — PROGRESS NOTE ADULT - ASSESSMENT
A/P: 38yo PPD#2 s/p  with Hx Klippel Trenaunay syndrome.  Patient is stable and doing well post-partum.     #Postpartum  - Pain well controlled, continue current pain regimen  - Increase ambulation, SCDs when not ambulating  - Continue regular diet  - Discharge planning     #Klippel Trenaunay syndrome  - CT thoracic spine showing no acute fractures or spondylolisthesis of the thoracic or lumbar spine. No large spinal AVMs visualized  - F/u outpatient    Ryann Sandoval MD PGY1
A/P: 36yo PPD#1 s/p  with Hx Klippel Trenaunay syndrome.  Patient is stable and doing well post-partum.     #Postpartum  - Pain well controlled, continue current pain regimen  - Increase ambulation, SCDs when not ambulating  - Continue regular diet    #Klippel Trenaunay syndrome  - CT thoracic spine showing no acute fractures or spondylolisthesis.of the thoracic or lumbar spine. No large spinal AVMs visualized  - F/u outpatient    Ryann Sandoval MD PGY1

## 2023-08-01 NOTE — PROGRESS NOTE ADULT - SUBJECTIVE AND OBJECTIVE BOX
OB Progress Note:  PPD#2    S: 37yyo PPD#2 s/p  with Hx Klippel Trenaunay syndrome. Patient feels well. Pain is well controlled. She is tolerating a regular diet and passing flatus. She is voiding spontaneously, and ambulating without difficulty. Denies CP/SOB. Denies lightheadedness/dizziness. Denies N/V.    O:  Vitals:   Vital Signs Last 24 Hrs  T(C): 36.6 (01 Aug 2023 06:35), Max: 36.6 (2023 17:48)  T(F): 97.8 (01 Aug 2023 06:35), Max: 97.9 (2023 17:48)  HR: 82 (01 Aug 2023 06:35) (76 - 82)  BP: 111/84 (01 Aug 2023 06:35) (99/66 - 111/84)  BP(mean): --  RR: 18 (01 Aug 2023 06:35) (18 - 18)  SpO2: 100% (01 Aug 2023 06:35) (100% - 100%)    Parameters below as of 01 Aug 2023 06:35  Patient On (Oxygen Delivery Method): room air        MEDICATIONS  (STANDING):  acetaminophen     Tablet .. 975 milliGRAM(s) Oral <User Schedule>  diphtheria/tetanus/pertussis (acellular) Vaccine (Adacel) 0.5 milliLiter(s) IntraMuscular once  ibuprofen  Tablet. 600 milliGRAM(s) Oral every 6 hours  prenatal multivitamin 1 Tablet(s) Oral daily  sodium chloride 0.9% lock flush 3 milliLiter(s) IV Push every 8 hours    MEDICATIONS  (PRN):  benzocaine 20%/menthol 0.5% Spray 1 Spray(s) Topical every 6 hours PRN for Perineal discomfort  dibucaine 1% Ointment 1 Application(s) Topical every 6 hours PRN Perineal discomfort  diphenhydrAMINE 25 milliGRAM(s) Oral every 6 hours PRN Pruritus  hydrocortisone 1% Cream 1 Application(s) Topical every 6 hours PRN Moderate Pain (4-6)  lanolin Ointment 1 Application(s) Topical every 6 hours PRN nipple soreness  magnesium hydroxide Suspension 30 milliLiter(s) Oral two times a day PRN Constipation  oxyCODONE    IR 5 milliGRAM(s) Oral every 3 hours PRN Moderate to Severe Pain (4-10)  oxyCODONE    IR 5 milliGRAM(s) Oral once PRN Moderate to Severe Pain (4-10)  pramoxine 1%/zinc 5% Cream 1 Application(s) Topical every 4 hours PRN Moderate Pain (4-6)  simethicone 80 milliGRAM(s) Chew every 4 hours PRN Gas  witch hazel Pads 1 Application(s) Topical every 4 hours PRN Perineal discomfort      Labs:  Blood type: O Positive  Rubella IgG: RPR: Negative                          9.6<L>   12.48<H> >-----------< 183    (  @ 06:50 )             29.3<L>                        10.0<L>   15.10<H> >-----------< 193    (  @ 05:35 )             30.4<L>                        13.8   7.97 >-----------< 224    (  @ 17:25 )             41.3                        13.3   9.87 >-----------< 221    (  @ 08:28 )             40.2                  Physical Exam:  General: NAD  Abdomen: soft, non-tender, non-distended, fundus firm  Vaginal: Lochia wnl  Extremities: No erythema/edema

## 2025-04-17 NOTE — OB RN DELIVERY SUMMARY - [facility]_OBGYN_ALL_OB_DT
Problem: PAIN - ADULT  Goal: Verbalizes/displays adequate comfort level or baseline comfort level  Description: Interventions:- Encourage patient to monitor pain and request assistance- Assess pain using appropriate pain scale- Administer analgesics based on type and severity of pain and evaluate response- Implement non-pharmacological measures as appropriate and evaluate response- Consider cultural and social influences on pain and pain management- Notify physician/advanced practitioner if interventions unsuccessful or patient reports new pain  Outcome: Progressing      25-Aug-2023